# Patient Record
Sex: FEMALE | Race: WHITE | NOT HISPANIC OR LATINO | URBAN - NONMETROPOLITAN AREA
[De-identification: names, ages, dates, MRNs, and addresses within clinical notes are randomized per-mention and may not be internally consistent; named-entity substitution may affect disease eponyms.]

---

## 2020-07-09 ENCOUNTER — IMPORTED ENCOUNTER (OUTPATIENT)
Dept: URBAN - NONMETROPOLITAN AREA CLINIC 1 | Facility: CLINIC | Age: 60
End: 2020-07-09

## 2020-07-09 PROBLEM — H52.4: Noted: 2020-07-09

## 2020-07-09 PROCEDURE — 92310 CONTACT LENS FITTING OU: CPT

## 2020-07-09 PROCEDURE — S0620 ROUTINE OPHTHALMOLOGICAL EXA: HCPCS

## 2020-07-09 NOTE — PATIENT DISCUSSION
Presbyopia OUDiscussed refractive status in detail with patient. New glasses and contact Rx given today. Discussed proper care replacement and hygiene. Continue to monitor.

## 2022-04-09 ASSESSMENT — KERATOMETRY
OD_AXISANGLE_DEGREES: 000
OD_K1POWER_DIOPTERS: 41.50
OS_K1POWER_DIOPTERS: 42.00
OS_K2POWER_DIOPTERS: 41.25
OD_K2POWER_DIOPTERS: 41.50
OS_AXISANGLE_DEGREES: 098

## 2022-04-09 ASSESSMENT — VISUAL ACUITY
OD_CC: 20/30
OS_CC: 20/30-

## 2022-04-09 ASSESSMENT — TONOMETRY
OS_IOP_MMHG: 15
OD_IOP_MMHG: 15